# Patient Record
Sex: FEMALE | Race: BLACK OR AFRICAN AMERICAN | NOT HISPANIC OR LATINO | Employment: OTHER | URBAN - METROPOLITAN AREA
[De-identification: names, ages, dates, MRNs, and addresses within clinical notes are randomized per-mention and may not be internally consistent; named-entity substitution may affect disease eponyms.]

---

## 2022-05-28 PROBLEM — E87.1 HYPONATREMIA: Status: ACTIVE | Noted: 2022-05-28

## 2022-05-28 PROBLEM — I25.10 CAD (CORONARY ARTERY DISEASE): Status: ACTIVE | Noted: 2022-05-28

## 2022-05-28 PROBLEM — R07.9 CHEST PAIN: Status: ACTIVE | Noted: 2022-05-28

## 2022-05-28 PROBLEM — I73.9 PVD (PERIPHERAL VASCULAR DISEASE): Status: ACTIVE | Noted: 2022-05-28

## 2022-05-28 PROBLEM — K21.9 GERD (GASTROESOPHAGEAL REFLUX DISEASE): Status: ACTIVE | Noted: 2022-05-28

## 2022-05-28 PROBLEM — I10 PRIMARY HYPERTENSION: Status: ACTIVE | Noted: 2022-05-28

## 2022-05-28 PROBLEM — N17.9 AKI (ACUTE KIDNEY INJURY): Status: ACTIVE | Noted: 2022-05-28

## 2022-05-28 PROBLEM — E87.6 HYPOKALEMIA: Status: ACTIVE | Noted: 2022-05-28

## 2022-05-28 PROBLEM — G40.909 SEIZURE DISORDER: Status: ACTIVE | Noted: 2022-05-28

## 2022-06-01 PROBLEM — R07.89 ATYPICAL CHEST PAIN: Status: ACTIVE | Noted: 2022-05-28

## 2022-06-03 PROBLEM — R07.9 CHEST PAIN: Status: RESOLVED | Noted: 2022-05-28 | Resolved: 2022-06-03

## 2022-06-03 PROBLEM — N17.9 AKI (ACUTE KIDNEY INJURY): Status: RESOLVED | Noted: 2022-05-28 | Resolved: 2022-06-03

## 2022-06-03 PROBLEM — E87.6 HYPOKALEMIA: Status: RESOLVED | Noted: 2022-05-28 | Resolved: 2022-06-03

## 2022-06-11 PROBLEM — M79.605 PAIN IN BOTH LOWER EXTREMITIES: Status: ACTIVE | Noted: 2022-06-11

## 2022-06-11 PROBLEM — M79.604 PAIN IN BOTH LOWER EXTREMITIES: Status: ACTIVE | Noted: 2022-06-11

## 2023-05-19 PROBLEM — Z60.4 ISOLATION (SOCIAL): Chronic | Status: ACTIVE | Noted: 2023-05-19

## 2023-05-19 PROBLEM — H26.9 CATARACT: Chronic | Status: ACTIVE | Noted: 2023-05-19

## 2023-05-19 PROBLEM — R22.31 AXILLARY MASS, RIGHT: Status: ACTIVE | Noted: 2023-05-19

## 2024-05-28 ENCOUNTER — PATIENT OUTREACH (OUTPATIENT)
Dept: ADMINISTRATIVE | Facility: OTHER | Age: 79
End: 2024-05-28

## 2024-05-28 NOTE — PROGRESS NOTES
"CHW - Initial Contact    This Community Health Worker completed  the Social Determinant of Health questionnaire with patient via telephone today.            Pt identified barriers of most importance are: utilities: Company: [employer] ([employer])    (Patient's light bill is in her son's name (been like this for twenty years)    - Patient caught the bus to go downtown to speak with someone about her utility bill but was told to call and pt was turned away    - Gas has been turned off    - Has a shut off notice for tomorrow (5/29/24) for the utilities if $200 isn't paid    - Patient is confused because she uses her U- Card from [facility] every month to pay her utilities     - Patient stated she let a "lady" pay her utility bill online (phone)  for her but doesn't know if she actually paid it because she still has a bill         Support and Services: CHW called [employer] rep stated they don't help senior with the utility bill they refer them to [facility]         Follow up required: yes      Follow-up Outreach - Due: 5/29/2024     "

## 2024-05-29 ENCOUNTER — PATIENT OUTREACH (OUTPATIENT)
Dept: ADMINISTRATIVE | Facility: OTHER | Age: 79
End: 2024-05-29

## 2024-05-29 NOTE — PROGRESS NOTES
Resources near pt:      2.  Tarik Montesinos [facility] (11 minutes away from pt)    Address: [address] [place] [address]     Phone: [phone]

## 2024-05-29 NOTE — PROGRESS NOTES
CHW - Follow Up    This Community Health Worker completed a follow up visit with patient via telephone today.    Pt/Caregiver reported: CHW informed pt about the call with the [employer] and rep stated they are doing crisis and non crisis assistance for energy but the pt has to call and make an appointment        Community Health Worker provided: [facility] [phone]       Follow up required: yes    Follow-up Outreach - Due: 6/3/2024

## 2024-06-03 ENCOUNTER — PATIENT OUTREACH (OUTPATIENT)
Dept: ADMINISTRATIVE | Facility: OTHER | Age: 79
End: 2024-06-03

## 2024-06-03 NOTE — PROGRESS NOTES
CHW - Outreach Attempt    Community Health Worker left a voicemail message for 1st attempt to contact patient regarding: energy assistance    Community Health Worker to attempt to contact patient on: 6/3/24

## 2024-06-05 ENCOUNTER — PATIENT OUTREACH (OUTPATIENT)
Dept: ADMINISTRATIVE | Facility: OTHER | Age: 79
End: 2024-06-05

## 2024-06-05 NOTE — PROGRESS NOTES
CHW - Case Closure    This Community Health Worker spoke to patient via telephone today.     Pt/Caregiver reported: patient stated she didn't call the [facility] about the energy assistance     CHW encouraged to call them In the future as they are help residence with energy assistance    Pt verbalized understanding    Pt/Caregiver denied any additional needs at this time and agrees with episode closure at this time.  Provided patient with Community Health Worker's contact information and encouraged him/her to contact this Community Health Worker if additional needs arise.